# Patient Record
Sex: MALE | Race: WHITE | NOT HISPANIC OR LATINO | Employment: FULL TIME | URBAN - NONMETROPOLITAN AREA
[De-identification: names, ages, dates, MRNs, and addresses within clinical notes are randomized per-mention and may not be internally consistent; named-entity substitution may affect disease eponyms.]

---

## 2023-09-20 ENCOUNTER — OFFICE VISIT (OUTPATIENT)
Dept: PRIMARY CARE | Facility: CLINIC | Age: 38
End: 2023-09-20
Payer: COMMERCIAL

## 2023-09-20 VITALS
TEMPERATURE: 97.8 F | OXYGEN SATURATION: 99 % | BODY MASS INDEX: 37.93 KG/M2 | SYSTOLIC BLOOD PRESSURE: 114 MMHG | DIASTOLIC BLOOD PRESSURE: 70 MMHG | HEIGHT: 66 IN | HEART RATE: 86 BPM | WEIGHT: 236 LBS

## 2023-09-20 DIAGNOSIS — R56.9 SEIZURES (MULTI): Primary | ICD-10-CM

## 2023-09-20 PROCEDURE — 1036F TOBACCO NON-USER: CPT | Performed by: FAMILY MEDICINE

## 2023-09-20 PROCEDURE — 80156 ASSAY CARBAMAZEPINE TOTAL: CPT

## 2023-09-20 PROCEDURE — 80053 COMPREHEN METABOLIC PANEL: CPT

## 2023-09-20 PROCEDURE — 99204 OFFICE O/P NEW MOD 45 MIN: CPT | Performed by: FAMILY MEDICINE

## 2023-09-20 RX ORDER — CARBAMAZEPINE 200 MG/1
TABLET ORAL
COMMUNITY
End: 2023-09-20 | Stop reason: SDUPTHER

## 2023-09-20 RX ORDER — CARBAMAZEPINE 200 MG/1
TABLET ORAL
Qty: 270 TABLET | Refills: 1 | Status: SHIPPED | OUTPATIENT
Start: 2023-09-20 | End: 2023-11-13 | Stop reason: SDUPTHER

## 2023-09-20 ASSESSMENT — ENCOUNTER SYMPTOMS
RHINORRHEA: 0
VOMITING: 0
FLANK PAIN: 0
DIZZINESS: 0
SLEEP DISTURBANCE: 0
NUMBNESS: 0
CONSTIPATION: 0
MYALGIAS: 0
DYSURIA: 0
UNEXPECTED WEIGHT CHANGE: 0
WHEEZING: 0
ABDOMINAL PAIN: 0
FEVER: 0
HEADACHES: 0
EYE DISCHARGE: 0
PALPITATIONS: 0
WEAKNESS: 0
APPETITE CHANGE: 0
COUGH: 0
SEIZURES: 1
EYE ITCHING: 0
HEMATURIA: 0
DIARRHEA: 0
SORE THROAT: 0
BLOOD IN STOOL: 0
JOINT SWELLING: 0
NAUSEA: 0
LIGHT-HEADEDNESS: 0
ACTIVITY CHANGE: 0
SINUS PRESSURE: 0
ARTHRALGIAS: 0
DYSPHORIC MOOD: 0
NERVOUS/ANXIOUS: 0
SHORTNESS OF BREATH: 0

## 2023-09-20 NOTE — PATIENT INSTRUCTIONS
SIGN THE ORDER FOR THE LICENSE   THE OFFICE WILL CALL ON THE LABS   SEE YEARLY SOONER WHEN NEEDED
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
## 2023-09-21 LAB
ALANINE AMINOTRANSFERASE (SGPT) (U/L) IN SER/PLAS: 33 U/L (ref 10–52)
ALBUMIN (G/DL) IN SER/PLAS: 4.5 G/DL (ref 3.4–5)
ALKALINE PHOSPHATASE (U/L) IN SER/PLAS: 77 U/L (ref 33–120)
ANION GAP IN SER/PLAS: 10 MMOL/L (ref 10–20)
ASPARTATE AMINOTRANSFERASE (SGOT) (U/L) IN SER/PLAS: 20 U/L (ref 9–39)
BILIRUBIN TOTAL (MG/DL) IN SER/PLAS: 0.3 MG/DL (ref 0–1.2)
CALCIUM (MG/DL) IN SER/PLAS: 9.9 MG/DL (ref 8.6–10.6)
CARBAMAZEPINE (UG/ML) IN SER/PLAS: 9.3 UG/ML (ref 4–12)
CARBON DIOXIDE, TOTAL (MMOL/L) IN SER/PLAS: 28 MMOL/L (ref 21–32)
CHLORIDE (MMOL/L) IN SER/PLAS: 102 MMOL/L (ref 98–107)
CREATININE (MG/DL) IN SER/PLAS: 0.78 MG/DL (ref 0.5–1.3)
GFR MALE: >90 ML/MIN/1.73M2
GLUCOSE (MG/DL) IN SER/PLAS: 101 MG/DL (ref 74–99)
POTASSIUM (MMOL/L) IN SER/PLAS: 3.9 MMOL/L (ref 3.5–5.3)
PROTEIN TOTAL: 7.4 G/DL (ref 6.4–8.2)
SODIUM (MMOL/L) IN SER/PLAS: 136 MMOL/L (ref 136–145)
UREA NITROGEN (MG/DL) IN SER/PLAS: 10 MG/DL (ref 6–23)

## 2023-09-28 ENCOUNTER — TELEPHONE (OUTPATIENT)
Dept: PRIMARY CARE | Facility: CLINIC | Age: 38
End: 2023-09-28
Payer: COMMERCIAL

## 2023-09-28 NOTE — LETTER
September 28, 2023     Hang Tobias  [address] [facility] [address]    Patient: Hang Tobias   YOB: 1985   Date of Visit: 9/28/2023       Khang Mauricio I have been trying to reach you regarding your results.  Please contact us at your earliest convenience.  Also, Please have a current number handy to give when you call.    Thank you,      Ro Jimenez MA      CC: No Recipients  ______________________________________________________________________________________

## 2023-11-13 DIAGNOSIS — R56.9 SEIZURES (MULTI): ICD-10-CM

## 2023-11-13 RX ORDER — CARBAMAZEPINE 200 MG/1
TABLET ORAL
Qty: 270 TABLET | Refills: 1 | Status: SHIPPED | OUTPATIENT
Start: 2023-11-13 | End: 2024-02-21

## 2024-01-08 ENCOUNTER — OFFICE VISIT (OUTPATIENT)
Dept: PRIMARY CARE | Facility: CLINIC | Age: 39
End: 2024-01-08
Payer: COMMERCIAL

## 2024-01-08 VITALS
HEART RATE: 105 BPM | DIASTOLIC BLOOD PRESSURE: 82 MMHG | TEMPERATURE: 97.4 F | BODY MASS INDEX: 37.48 KG/M2 | WEIGHT: 234 LBS | OXYGEN SATURATION: 98 % | SYSTOLIC BLOOD PRESSURE: 116 MMHG

## 2024-01-08 DIAGNOSIS — R05.8 DRY COUGH: ICD-10-CM

## 2024-01-08 DIAGNOSIS — R05.1 ACUTE COUGH: ICD-10-CM

## 2024-01-08 DIAGNOSIS — R51.9 ACUTE NONINTRACTABLE HEADACHE, UNSPECIFIED HEADACHE TYPE: ICD-10-CM

## 2024-01-08 LAB — RSV RNA RESP QL NAA+PROBE: NOT DETECTED

## 2024-01-08 PROCEDURE — 87637 SARSCOV2&INF A&B&RSV AMP PRB: CPT

## 2024-01-08 PROCEDURE — 99214 OFFICE O/P EST MOD 30 MIN: CPT | Performed by: FAMILY MEDICINE

## 2024-01-08 PROCEDURE — 1036F TOBACCO NON-USER: CPT | Performed by: FAMILY MEDICINE

## 2024-01-08 ASSESSMENT — ENCOUNTER SYMPTOMS
HEADACHES: 1
RHINORRHEA: 0
SORE THROAT: 1
COUGH: 1
SINUS PRESSURE: 0
DIARRHEA: 0
WEAKNESS: 0
ABDOMINAL PAIN: 0
FLANK PAIN: 0
NERVOUS/ANXIOUS: 0
DIZZINESS: 0
FATIGUE: 1
VOMITING: 0
SHORTNESS OF BREATH: 1
NAUSEA: 0
UNEXPECTED WEIGHT CHANGE: 0
APPETITE CHANGE: 0
LIGHT-HEADEDNESS: 0
BLOOD IN STOOL: 0
CHILLS: 1
CONSTIPATION: 0
JOINT SWELLING: 0
HEMATURIA: 0
MYALGIAS: 1
DYSURIA: 0
SLEEP DISTURBANCE: 0
ARTHRALGIAS: 0
DYSPHORIC MOOD: 0
WHEEZING: 0
PALPITATIONS: 0
EYE ITCHING: 0
FEVER: 0
ACTIVITY CHANGE: 0
EYE DISCHARGE: 0
NUMBNESS: 0
DIAPHORESIS: 1

## 2024-01-08 NOTE — PROGRESS NOTES
Subjective   Patient ID: Hang Tobias is a 38 y.o. male who presents for cough (All sxs since Friday), muscle aches, Headache, and fatigue.  Work person ill now the patient and his wife   He was covid - at home     HPI     Review of Systems   Constitutional:  Positive for chills, diaphoresis and fatigue. Negative for activity change, appetite change, fever and unexpected weight change.   HENT:  Positive for congestion and sore throat. Negative for ear pain, postnasal drip, rhinorrhea and sinus pressure.    Eyes:  Negative for discharge, itching and visual disturbance.   Respiratory:  Positive for cough and shortness of breath. Negative for wheezing.    Cardiovascular:  Negative for chest pain, palpitations and leg swelling.   Gastrointestinal:  Negative for abdominal pain, blood in stool, constipation, diarrhea, nausea and vomiting.   Endocrine: Negative for cold intolerance, heat intolerance and polyuria.   Genitourinary:  Negative for dysuria, flank pain and hematuria.   Musculoskeletal:  Positive for myalgias. Negative for arthralgias, gait problem and joint swelling.   Skin:  Negative for rash.   Allergic/Immunologic: Negative for environmental allergies and food allergies.   Neurological:  Positive for headaches. Negative for dizziness, syncope, weakness, light-headedness and numbness.   Psychiatric/Behavioral:  Negative for dysphoric mood and sleep disturbance. The patient is not nervous/anxious.        Objective   /82   Pulse 105   Temp 36.3 °C (97.4 °F)   Wt 106 kg (234 lb)   SpO2 98%   BMI 37.48 kg/m²     Physical Exam  Vitals and nursing note reviewed.   Constitutional:       Appearance: Normal appearance. He is ill-appearing.   HENT:      Head: Normocephalic.      Mouth/Throat:      Mouth: Mucous membranes are moist.   Cardiovascular:      Rate and Rhythm: Regular rhythm. Tachycardia present.      Pulses: Normal pulses.      Heart sounds: Normal heart sounds. No murmur heard.     No friction  rub. No gallop.   Pulmonary:      Effort: Respiratory distress present.      Breath sounds: Rhonchi and rales present. No wheezing.   Abdominal:      General: Bowel sounds are normal. There is no distension.      Palpations: Abdomen is soft.      Tenderness: There is no abdominal tenderness.   Musculoskeletal:         General: No deformity. Normal range of motion.   Skin:     General: Skin is warm and dry.      Capillary Refill: Capillary refill takes less than 2 seconds.   Neurological:      General: No focal deficit present.      Mental Status: He is alert and oriented to person, place, and time.   Psychiatric:         Mood and Affect: Mood normal.         Assessment/Plan   Diagnoses and all orders for this visit:  Acute cough  -     RSV PCR  -     Influenza A, and B PCR  -     Sars-CoV-2 PCR, Symptomatic  Dry cough  -     RSV PCR  -     Influenza A, and B PCR  -     Sars-CoV-2 PCR, Symptomatic  Acute nonintractable headache, unspecified headache type  -     RSV PCR  -     Influenza A, and B PCR  -     Sars-CoV-2 PCR, Symptomatic

## 2024-01-08 NOTE — LETTER
January 8, 2024     Patient: Hang Tobias   YOB: 1985   Date of Visit: 1/8/2024       To Whom It May Concern:    Hang Tobias was seen in my clinic on 1/8/2024 at 4:00 pm. Please excuse Hang for his absence from work on the following days: 1/5/24-1/11/24.    If you have any questions or concerns, please don't hesitate to call.         Sincerely,         Joanne Weems,         CC: No Recipients

## 2024-01-09 ENCOUNTER — TELEPHONE (OUTPATIENT)
Dept: PRIMARY CARE | Facility: CLINIC | Age: 39
End: 2024-01-09
Payer: COMMERCIAL

## 2024-01-09 LAB
FLUAV RNA RESP QL NAA+PROBE: DETECTED
FLUBV RNA RESP QL NAA+PROBE: NOT DETECTED
SARS-COV-2 RNA RESP QL NAA+PROBE: NOT DETECTED

## 2024-01-09 NOTE — TELEPHONE ENCOUNTER
----- Message from Joanne Weems DO sent at 1/9/2024  8:24 AM EST -----  He has type a flu  Was it too late for tamiflu for him 72 hours ?

## 2024-01-09 NOTE — LETTER
January 16, 2024     Hang Tobias  [address] [facility] [address]    Patient: Hang Tobias   YOB: 1985   Date of Visit: 1/9/2024       Lalito FARMER have been trying to reach you regarding your results. Please contact the office to update your contact information.      Sincerely,     Ro Jimenez MA      CC: No Recipients  ______________________________________________________________________________________

## 2024-01-16 NOTE — TELEPHONE ENCOUNTER
Number is not working.  I attempted to contact people on his HIPAA and they went to  right away.  Letter sent.

## 2024-02-21 DIAGNOSIS — R56.9 SEIZURES (MULTI): ICD-10-CM

## 2024-02-21 RX ORDER — CARBAMAZEPINE 200 MG/1
TABLET ORAL
Qty: 270 TABLET | Refills: 0 | Status: SHIPPED | OUTPATIENT
Start: 2024-02-21

## 2024-06-21 ENCOUNTER — OFFICE VISIT (OUTPATIENT)
Dept: PRIMARY CARE | Facility: CLINIC | Age: 39
End: 2024-06-21
Payer: COMMERCIAL

## 2024-06-21 VITALS
BODY MASS INDEX: 36.36 KG/M2 | WEIGHT: 227 LBS | HEART RATE: 83 BPM | DIASTOLIC BLOOD PRESSURE: 64 MMHG | TEMPERATURE: 96.9 F | OXYGEN SATURATION: 97 % | SYSTOLIC BLOOD PRESSURE: 102 MMHG

## 2024-06-21 DIAGNOSIS — R56.9 SEIZURES (MULTI): Primary | ICD-10-CM

## 2024-06-21 PROCEDURE — 99214 OFFICE O/P EST MOD 30 MIN: CPT | Performed by: FAMILY MEDICINE

## 2024-06-21 PROCEDURE — 1036F TOBACCO NON-USER: CPT | Performed by: FAMILY MEDICINE

## 2024-06-21 RX ORDER — CARBAMAZEPINE 200 MG/1
TABLET ORAL
Qty: 270 TABLET | Refills: 3 | Status: SHIPPED | OUTPATIENT
Start: 2024-06-21

## 2024-06-21 ASSESSMENT — ENCOUNTER SYMPTOMS
NUMBNESS: 0
SHORTNESS OF BREATH: 0
BLOOD IN STOOL: 0
HEMATURIA: 0
UNEXPECTED WEIGHT CHANGE: 0
JOINT SWELLING: 0
HEADACHES: 0
WHEEZING: 0
NAUSEA: 0
ACTIVITY CHANGE: 0
DIZZINESS: 0
RHINORRHEA: 0
SLEEP DISTURBANCE: 0
MYALGIAS: 0
CONSTIPATION: 0
ABDOMINAL PAIN: 0
COUGH: 0
ARTHRALGIAS: 0
DIARRHEA: 0
FLANK PAIN: 0
VOMITING: 0
EYE ITCHING: 0
APPETITE CHANGE: 0
WEAKNESS: 0
SORE THROAT: 0
NERVOUS/ANXIOUS: 0
FEVER: 0
LIGHT-HEADEDNESS: 0
PALPITATIONS: 0
SINUS PRESSURE: 0
DYSURIA: 0
EYE DISCHARGE: 0
DYSPHORIC MOOD: 0

## 2024-06-21 NOTE — PROGRESS NOTES
Subjective   Patient ID: Hang Tobias is a 38 y.o. male who presents for Med Refill.    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   HENT:  Negative for congestion, ear pain, postnasal drip, rhinorrhea, sinus pressure and sore throat.    Eyes:  Negative for discharge, itching and visual disturbance.   Respiratory:  Negative for cough, shortness of breath and wheezing.    Cardiovascular:  Negative for chest pain, palpitations and leg swelling.   Gastrointestinal:  Negative for abdominal pain, blood in stool, constipation, diarrhea, nausea and vomiting.   Endocrine: Negative for cold intolerance, heat intolerance and polyuria.   Genitourinary:  Negative for dysuria, flank pain and hematuria.   Musculoskeletal:  Negative for arthralgias, gait problem, joint swelling and myalgias.   Skin:  Negative for rash.   Allergic/Immunologic: Negative for environmental allergies and food allergies.   Neurological:  Negative for dizziness, syncope, weakness, light-headedness, numbness and headaches.   Psychiatric/Behavioral:  Negative for dysphoric mood and sleep disturbance. The patient is not nervous/anxious.        Objective   /64   Pulse 83   Temp 36.1 °C (96.9 °F)   Wt 103 kg (227 lb)   SpO2 97%   BMI 36.36 kg/m²     Physical Exam  Vitals and nursing note reviewed.   Constitutional:       Appearance: Normal appearance. He is obese.   HENT:      Head: Normocephalic.      Mouth/Throat:      Mouth: Mucous membranes are moist.   Cardiovascular:      Rate and Rhythm: Normal rate and regular rhythm.      Pulses: Normal pulses.      Heart sounds: Normal heart sounds. No murmur heard.     No friction rub. No gallop.   Pulmonary:      Effort: Pulmonary effort is normal. No respiratory distress.       Breath sounds: Normal breath sounds. No wheezing.   Abdominal:      General: Bowel sounds are normal. There is no distension.      Palpations: Abdomen is soft.      Tenderness: There is no abdominal tenderness.   Musculoskeletal:         General: No deformity. Normal range of motion.   Skin:     General: Skin is warm and dry.      Capillary Refill: Capillary refill takes less than 2 seconds.   Neurological:      General: No focal deficit present.      Mental Status: He is alert and oriented to person, place, and time.   Psychiatric:         Mood and Affect: Mood normal.         Assessment/Plan   Problem List Items Addressed This Visit             ICD-10-CM    Seizures (Multi) - Primary R56.9    Relevant Medications    carBAMazepine (TEGretol) 200 mg tablet

## 2024-09-13 ENCOUNTER — TELEPHONE (OUTPATIENT)
Dept: PRIMARY CARE | Facility: CLINIC | Age: 39
End: 2024-09-13
Payer: COMMERCIAL

## 2024-11-19 ENCOUNTER — OFFICE VISIT (OUTPATIENT)
Dept: URGENT CARE | Facility: URGENT CARE | Age: 39
End: 2024-11-19
Payer: COMMERCIAL

## 2024-11-19 VITALS
WEIGHT: 238.1 LBS | OXYGEN SATURATION: 95 % | RESPIRATION RATE: 18 BRPM | BODY MASS INDEX: 38.14 KG/M2 | HEART RATE: 111 BPM | SYSTOLIC BLOOD PRESSURE: 139 MMHG | TEMPERATURE: 98.6 F | DIASTOLIC BLOOD PRESSURE: 85 MMHG

## 2024-11-19 DIAGNOSIS — J98.01 BRONCHOSPASM, ACUTE: ICD-10-CM

## 2024-11-19 DIAGNOSIS — J20.9 ACUTE BRONCHITIS WITH BRONCHOSPASM: ICD-10-CM

## 2024-11-19 DIAGNOSIS — J06.9 VIRAL URI: Primary | ICD-10-CM

## 2024-11-19 PROCEDURE — 99203 OFFICE O/P NEW LOW 30 MIN: CPT | Performed by: FAMILY MEDICINE

## 2024-11-19 PROCEDURE — 1036F TOBACCO NON-USER: CPT | Performed by: FAMILY MEDICINE

## 2024-11-19 RX ORDER — ALBUTEROL SULFATE 90 UG/1
INHALANT RESPIRATORY (INHALATION)
Qty: 6.7 G | Refills: 0 | Status: SHIPPED | OUTPATIENT
Start: 2024-11-19

## 2024-11-19 RX ORDER — INHALER, ASSIST DEVICES
SPACER (EA) MISCELLANEOUS
Qty: 1 EACH | Refills: 0 | Status: SHIPPED | OUTPATIENT
Start: 2024-11-19

## 2024-11-19 ASSESSMENT — ENCOUNTER SYMPTOMS
CHEST TIGHTNESS: 1
WHEEZING: 1
NAUSEA: 0
HEADACHES: 1
SINUS PRESSURE: 0
RHINORRHEA: 1
SHORTNESS OF BREATH: 1
ABDOMINAL PAIN: 1
SORE THROAT: 0
CHILLS: 0
DIARRHEA: 1
PALPITATIONS: 0
VOMITING: 0
DYSURIA: 0
FREQUENCY: 0
CONSTIPATION: 0
COUGH: 1
FEVER: 1

## 2024-11-19 NOTE — PROGRESS NOTES
Subjective   Patient ID: Hang Tobias is a 38 y.o. male.    HPI: 38-year-old male presents with a complaint of wheezing x 4 days and a cough since yesterday.  Reports he is taken DayQuil and NyQuil without symptom relief.      History provided by:  Patient   used: No    Cough  This is a new problem. The current episode started yesterday. The problem has been gradually worsening. The problem occurs every few minutes. The cough is Productive of purulent sputum. Associated symptoms include a fever, headaches, rhinorrhea, shortness of breath and wheezing. Pertinent negatives include no chills, ear pain or sore throat. The symptoms are aggravated by lying down. Risk factors for lung disease include occupational exposure. He has tried nothing for the symptoms.   Wheezing  Associated symptoms: chest tightness, cough, fever, headaches, rhinorrhea and shortness of breath    Associated symptoms: no ear pain and no sore throat        The following portions of the chart were reviewed this encounter and updated as appropriate:  Tobacco  Allergies  Meds  Problems  Med Hx  Surg Hx  Fam Hx         Review of Systems   Constitutional:  Positive for fever. Negative for chills.   HENT:  Positive for rhinorrhea. Negative for congestion, ear pain, sinus pressure and sore throat.    Respiratory:  Positive for cough, chest tightness, shortness of breath and wheezing.    Cardiovascular:  Negative for palpitations.   Gastrointestinal:  Positive for abdominal pain and diarrhea. Negative for constipation, nausea and vomiting.   Genitourinary:  Negative for dysuria and frequency.   Neurological:  Positive for headaches.     Objective   Physical Exam  Vital signs are reviewed.  Tachycardia at 111 bpm.  Pulse oximetry 95% on room air.  Alert and oriented x3 with normal mood and affect  Patient is well nourished, well-developed, alert and in no acute distress    External eyes, orbits, conjunctiva and eyelids are  normal in appearance  Pupils are equal, round, reactive to light and accommodation, extraocular movements intact    External ears appear normal  External canals are normal in appearance  Right tympanic membrane is intact and pale gray in appearance  Left tympanic membrane is intact and pale gray in appearance  There is no middle ear effusion noted on the right  There is no middle ear effusion noted on the left  External appearance of the nose is normal  Nasal mucosa, septum, turbinates are reddened and moderately swollen in appearance  There is scant thin yellow nasal discharge in both nares    Oral mucosa is uniformly pink and moist  Palate is pink, symmetric and intact  Tongue is moist, mobile and midline  Posterior pharynx moderately erythematous with no concretions or exudates present  No cervical lymphadenopathy palpated    Heart has regular rate and rhythm. No murmurs, rubs or gallops are auscultated at this exam.    Respiratory rate rhythm and effort are normal. Breath sounds bilaterally are coarse on auscultation without crackles, rhonchi or friction rub.  Fine end expiratory wheezes.    Abdomen: Normal bowel sounds on auscultation. Soft, nontender without rebound or rigidity on palpation  Procedures    Assessment/Plan   Diagnoses and all orders for this visit:  Viral URI  Acute bronchitis with bronchospasm  -     inhalational spacing device (Aerochamber MV) inhaler; Use as instructed  -     albuterol (Proventil HFA) 90 mcg/actuation inhaler; Take 1 puff via spacer,  take 5-6 easy breaths.  Rest 2 minutes.  Repeat x1.  May use every 4-6 hours as needed  Bronchospasm, acute  -     inhalational spacing device (Aerochamber MV) inhaler; Use as instructed  -     albuterol (Proventil HFA) 90 mcg/actuation inhaler; Take 1 puff via spacer,  take 5-6 easy breaths.  Rest 2 minutes.  Repeat x1.  May use every 4-6 hours as needed    Patient disposition: Home

## 2024-11-19 NOTE — PATIENT INSTRUCTIONS
You have a viral upper respiratory infection with cough and acute bronchitis with bronchospasm  Use inhaler with spacer device 2 puffs every 4-6 hours for the next 7-10 days, decrease frequency of use as symptoms improve. Please take 5-6 deep breaths after activating inhaler from spacer chamber. Pause for approximately 1-2 minutes.  Repeat activation of inhaler and 5-6 deep breaths from spacer.  Decrease frequency of use as symptoms improve  May use ibuprofen 200mg, 2 tabs by mouth every 8 hours with food as needed for discomfort or fever.  May take Tylenol 325 mg 2 tablets by mouth every 4-6 hours as needed for discomfort or fever  If no improvement in 5-7 days follow-up with primary care provider  If fever greater than 102 degrees Fahrenheit, chills, nausea, vomiting,  increased difficulty breathing, difficulty swallowing,  increased shortness of breath, worsening wheezing go to emergency department for further evaluation  This note was generated by voice recognition software. Minor transcription/grammatical errors may be present. Please call for clarification.

## 2024-11-20 ENCOUNTER — APPOINTMENT (OUTPATIENT)
Dept: CARDIOLOGY | Facility: HOSPITAL | Age: 39
End: 2024-11-20
Payer: COMMERCIAL

## 2024-11-20 ENCOUNTER — APPOINTMENT (OUTPATIENT)
Dept: RADIOLOGY | Facility: HOSPITAL | Age: 39
End: 2024-11-20
Payer: COMMERCIAL

## 2024-11-20 ENCOUNTER — HOSPITAL ENCOUNTER (EMERGENCY)
Facility: HOSPITAL | Age: 39
Discharge: HOME | End: 2024-11-20
Attending: EMERGENCY MEDICINE
Payer: COMMERCIAL

## 2024-11-20 VITALS
SYSTOLIC BLOOD PRESSURE: 122 MMHG | BODY MASS INDEX: 38.25 KG/M2 | TEMPERATURE: 99.4 F | OXYGEN SATURATION: 95 % | WEIGHT: 238 LBS | DIASTOLIC BLOOD PRESSURE: 79 MMHG | HEART RATE: 96 BPM | HEIGHT: 66 IN | RESPIRATION RATE: 18 BRPM

## 2024-11-20 DIAGNOSIS — R06.02 SHORTNESS OF BREATH: ICD-10-CM

## 2024-11-20 DIAGNOSIS — J18.9 PNEUMONIA OF LEFT LOWER LOBE DUE TO INFECTIOUS ORGANISM: Primary | ICD-10-CM

## 2024-11-20 DIAGNOSIS — R50.9 FEBRILE ILLNESS: ICD-10-CM

## 2024-11-20 DIAGNOSIS — R05.1 ACUTE COUGH: ICD-10-CM

## 2024-11-20 LAB
ALBUMIN SERPL BCP-MCNC: 3.7 G/DL (ref 3.4–5)
ALP SERPL-CCNC: 71 U/L (ref 33–120)
ALT SERPL W P-5'-P-CCNC: 17 U/L (ref 10–52)
ANION GAP SERPL CALC-SCNC: 12 MMOL/L (ref 10–20)
AST SERPL W P-5'-P-CCNC: 19 U/L (ref 9–39)
BASOPHILS # BLD AUTO: 0.05 X10*3/UL (ref 0–0.1)
BASOPHILS NFR BLD AUTO: 0.5 %
BILIRUB SERPL-MCNC: 0.5 MG/DL (ref 0–1.2)
BUN SERPL-MCNC: 7 MG/DL (ref 6–23)
CALCIUM SERPL-MCNC: 8.8 MG/DL (ref 8.6–10.3)
CARDIAC TROPONIN I PNL SERPL HS: 5 NG/L (ref 0–20)
CHLORIDE SERPL-SCNC: 98 MMOL/L (ref 98–107)
CO2 SERPL-SCNC: 26 MMOL/L (ref 21–32)
CREAT SERPL-MCNC: 0.75 MG/DL (ref 0.5–1.3)
EGFRCR SERPLBLD CKD-EPI 2021: >90 ML/MIN/1.73M*2
EOSINOPHIL # BLD AUTO: 0.11 X10*3/UL (ref 0–0.7)
EOSINOPHIL NFR BLD AUTO: 1 %
ERYTHROCYTE [DISTWIDTH] IN BLOOD BY AUTOMATED COUNT: 11.9 % (ref 11.5–14.5)
FLUAV RNA RESP QL NAA+PROBE: NOT DETECTED
FLUBV RNA RESP QL NAA+PROBE: NOT DETECTED
GLUCOSE SERPL-MCNC: 127 MG/DL (ref 74–99)
HCT VFR BLD AUTO: 41.5 % (ref 41–52)
HGB BLD-MCNC: 14.1 G/DL (ref 13.5–17.5)
IMM GRANULOCYTES # BLD AUTO: 0.05 X10*3/UL (ref 0–0.7)
IMM GRANULOCYTES NFR BLD AUTO: 0.5 % (ref 0–0.9)
LYMPHOCYTES # BLD AUTO: 1.4 X10*3/UL (ref 1.2–4.8)
LYMPHOCYTES NFR BLD AUTO: 13 %
MCH RBC QN AUTO: 28.5 PG (ref 26–34)
MCHC RBC AUTO-ENTMCNC: 34 G/DL (ref 32–36)
MCV RBC AUTO: 84 FL (ref 80–100)
MONOCYTES # BLD AUTO: 0.72 X10*3/UL (ref 0.1–1)
MONOCYTES NFR BLD AUTO: 6.7 %
NEUTROPHILS # BLD AUTO: 8.47 X10*3/UL (ref 1.2–7.7)
NEUTROPHILS NFR BLD AUTO: 78.3 %
NRBC BLD-RTO: 0 /100 WBCS (ref 0–0)
PLATELET # BLD AUTO: 227 X10*3/UL (ref 150–450)
POTASSIUM SERPL-SCNC: 3.4 MMOL/L (ref 3.5–5.3)
PROT SERPL-MCNC: 7 G/DL (ref 6.4–8.2)
RBC # BLD AUTO: 4.94 X10*6/UL (ref 4.5–5.9)
RSV RNA RESP QL NAA+PROBE: NOT DETECTED
S PYO DNA THROAT QL NAA+PROBE: NOT DETECTED
SARS-COV-2 RNA RESP QL NAA+PROBE: NOT DETECTED
SODIUM SERPL-SCNC: 133 MMOL/L (ref 136–145)
WBC # BLD AUTO: 10.8 X10*3/UL (ref 4.4–11.3)

## 2024-11-20 PROCEDURE — 99284 EMERGENCY DEPT VISIT MOD MDM: CPT | Mod: 25

## 2024-11-20 PROCEDURE — 87651 STREP A DNA AMP PROBE: CPT | Performed by: EMERGENCY MEDICINE

## 2024-11-20 PROCEDURE — 84484 ASSAY OF TROPONIN QUANT: CPT | Performed by: EMERGENCY MEDICINE

## 2024-11-20 PROCEDURE — 71046 X-RAY EXAM CHEST 2 VIEWS: CPT

## 2024-11-20 PROCEDURE — 93005 ELECTROCARDIOGRAM TRACING: CPT

## 2024-11-20 PROCEDURE — 96374 THER/PROPH/DIAG INJ IV PUSH: CPT

## 2024-11-20 PROCEDURE — 36415 COLL VENOUS BLD VENIPUNCTURE: CPT | Performed by: EMERGENCY MEDICINE

## 2024-11-20 PROCEDURE — 94760 N-INVAS EAR/PLS OXIMETRY 1: CPT

## 2024-11-20 PROCEDURE — 94640 AIRWAY INHALATION TREATMENT: CPT

## 2024-11-20 PROCEDURE — 2500000002 HC RX 250 W HCPCS SELF ADMINISTERED DRUGS (ALT 637 FOR MEDICARE OP, ALT 636 FOR OP/ED): Performed by: EMERGENCY MEDICINE

## 2024-11-20 PROCEDURE — 87637 SARSCOV2&INF A&B&RSV AMP PRB: CPT | Performed by: EMERGENCY MEDICINE

## 2024-11-20 PROCEDURE — 80053 COMPREHEN METABOLIC PANEL: CPT | Performed by: EMERGENCY MEDICINE

## 2024-11-20 PROCEDURE — 9420000001 HC RT PATIENT EDUCATION 5 MIN

## 2024-11-20 PROCEDURE — 94664 DEMO&/EVAL PT USE INHALER: CPT | Mod: 59

## 2024-11-20 PROCEDURE — 2500000004 HC RX 250 GENERAL PHARMACY W/ HCPCS (ALT 636 FOR OP/ED): Performed by: EMERGENCY MEDICINE

## 2024-11-20 PROCEDURE — 2500000001 HC RX 250 WO HCPCS SELF ADMINISTERED DRUGS (ALT 637 FOR MEDICARE OP): Performed by: EMERGENCY MEDICINE

## 2024-11-20 PROCEDURE — 71046 X-RAY EXAM CHEST 2 VIEWS: CPT | Mod: FOREIGN READ | Performed by: RADIOLOGY

## 2024-11-20 PROCEDURE — 85025 COMPLETE CBC W/AUTO DIFF WBC: CPT | Performed by: EMERGENCY MEDICINE

## 2024-11-20 RX ORDER — BROMPHENIRAMINE MALEATE, PSEUDOEPHEDRINE HYDROCHLORIDE, AND DEXTROMETHORPHAN HYDROBROMIDE 2; 30; 10 MG/5ML; MG/5ML; MG/5ML
5 SYRUP ORAL 4 TIMES DAILY PRN
Qty: 120 ML | Refills: 0 | Status: SHIPPED | OUTPATIENT
Start: 2024-11-20 | End: 2024-11-30

## 2024-11-20 RX ORDER — PREDNISONE 20 MG/1
40 TABLET ORAL DAILY
Qty: 10 TABLET | Refills: 0 | Status: SHIPPED | OUTPATIENT
Start: 2024-11-20 | End: 2024-11-25

## 2024-11-20 RX ORDER — DOXYCYCLINE 100 MG/1
100 TABLET ORAL 2 TIMES DAILY
Qty: 14 TABLET | Refills: 0 | Status: SHIPPED | OUTPATIENT
Start: 2024-11-20 | End: 2024-11-27

## 2024-11-20 RX ORDER — IPRATROPIUM BROMIDE AND ALBUTEROL SULFATE 2.5; .5 MG/3ML; MG/3ML
3 SOLUTION RESPIRATORY (INHALATION) ONCE
Status: COMPLETED | OUTPATIENT
Start: 2024-11-20 | End: 2024-11-20

## 2024-11-20 RX ORDER — ACETAMINOPHEN 325 MG/1
975 TABLET ORAL ONCE
Status: COMPLETED | OUTPATIENT
Start: 2024-11-20 | End: 2024-11-20

## 2024-11-20 ASSESSMENT — PAIN - FUNCTIONAL ASSESSMENT: PAIN_FUNCTIONAL_ASSESSMENT: 0-10

## 2024-11-20 ASSESSMENT — PAIN SCALES - GENERAL: PAINLEVEL_OUTOF10: 6

## 2024-11-20 ASSESSMENT — COLUMBIA-SUICIDE SEVERITY RATING SCALE - C-SSRS
1. IN THE PAST MONTH, HAVE YOU WISHED YOU WERE DEAD OR WISHED YOU COULD GO TO SLEEP AND NOT WAKE UP?: NO
2. HAVE YOU ACTUALLY HAD ANY THOUGHTS OF KILLING YOURSELF?: NO
6. HAVE YOU EVER DONE ANYTHING, STARTED TO DO ANYTHING, OR PREPARED TO DO ANYTHING TO END YOUR LIFE?: NO

## 2024-11-20 ASSESSMENT — PAIN DESCRIPTION - LOCATION: LOCATION: CHEST

## 2024-11-20 NOTE — Clinical Note
Hang Jatinder was seen and treated in our emergency department on 11/20/2024.  He may return to work on 11/25/2024.       If you have any questions or concerns, please don't hesitate to call.      Arcadio Jones, DO

## 2024-11-20 NOTE — ED TRIAGE NOTES
Pt from home to the ER for cough fever SOB and CP since Saturday night. Pt was seen at the urgent care yesterday pt states no testing was done just sent him home with an inhaler. Pt states his son had a PNA 3 weeks ago.

## 2024-11-20 NOTE — DISCHARGE INSTRUCTIONS
Use the albuterol inhaler you got yesterday from the urgent care center.  2 puffs every 4 hours as needed for cough/wheezing.    Alternate Tylenol 650 mg every 6 hours with ibuprofen 600 mg every 6 hours as needed for fevers body aches or pain.

## 2024-11-20 NOTE — ED PROVIDER NOTES
HPI   Chief Complaint   Patient presents with    Shortness of Breath    Chest Pain    Cough    Fever       38-year-old male presents for evaluation of shortness of breath, productive cough, wheezing, and fevers.  Patient has had symptoms for the past 5 days.  His son recently had pneumonia.  Cough is productive of green sputum.  Shortness of breath with exertion with associated wheezing.  Shortness of breath improves with rest.  No chest pain.  No vomiting.  Seen in urgent care yesterday.  Prescribed albuterol inhaler.  No history of PE or DVT.  No risk factors for venous thromboembolism.      History provided by:  Patient and medical records          Patient History   Past Medical History:   Diagnosis Date    Epilepsy      Past Surgical History:   Procedure Laterality Date    DENTAL SURGERY       Family History   Problem Relation Name Age of Onset    No Known Problems Mother      Epilepsy Other          [facility]     Social History     Tobacco Use    Smoking status: Never    Smokeless tobacco: Never   Vaping Use    Vaping status: Never Used   Substance Use Topics    Alcohol use: Yes     Comment: occasional    Drug use: Never       Physical Exam   ED Triage Vitals [11/20/24 1012]   Temperature Heart Rate Respirations BP   (!) 38.7 °C (101.6 °F) (!) 105 19 155/85      Pulse Ox Temp Source Heart Rate Source Patient Position   94 % Oral -- Sitting      BP Location FiO2 (%)     Right arm --       Physical Exam  Vitals and nursing note reviewed.   Constitutional:       General: He is not in acute distress.     Appearance: He is well-developed.   HENT:      Head: Normocephalic and atraumatic.   Eyes:      Conjunctiva/sclera: Conjunctivae normal.   Cardiovascular:      Rate and Rhythm: Regular rhythm. Tachycardia present.      Pulses: Normal pulses.      Heart sounds: No murmur heard.  Pulmonary:      Effort: Pulmonary effort is normal. No respiratory distress.      Breath sounds: Wheezing and rhonchi present.   Abdominal:       Palpations: Abdomen is soft.      Tenderness: There is no abdominal tenderness.   Musculoskeletal:         General: No swelling.      Cervical back: Neck supple.   Skin:     General: Skin is warm and dry.      Capillary Refill: Capillary refill takes less than 2 seconds.   Neurological:      Mental Status: He is alert.   Psychiatric:         Mood and Affect: Mood normal.           ED Course & MDM   ED Course as of 11/20/24 1243   Wed Nov 20, 2024   1047 ECG 12 lead  EKG interpreted by me shows sinus tachycardia with rate of 102.  Normal axis.  Normal intervals.  No acute injury pattern. [BT]   1104 38-year-old male presents with flulike symptoms, productive cough.  He is febrile and tachycardic on arrival.  Tachycardia likely secondary to fever and infectious presentation.  He has no risk factors for DVT or PE.  PERC negative.  Doubt pulmonary embolism.  Laboratory studies including single troponin.  COVID flu RSV strep.  2 view chest x-ray. [BT]   1233 XR chest 2 views  LUNGS:  There is a patchy infiltrate of the left lung base suspicious for  pneumonia.  No pleural effusion.     ABDOMEN:  No remarkable upper abdominal findings.     BONES:  No acute osseous changes.  IMPRESSION:  Left lower lobe pneumonia.  Signed by Reza Sethi MD    Noted [BT]   1240 COVID flu RSV negative.  Strep negative.  CBC, chemistry unremarkable.  Troponin negative.  EKG without acute injury pattern.  Doubt acute coronary syndrome.    Chest x-ray showed left lower lobe pneumonia.  This explains his fever and tachycardia, which have improved with Tylenol.    I considered admission.  No hypoxia or respiratory distress.  He is tolerating oral intake.  Safe for trial of outpatient management for community-acquired pneumonia.  He has an albuterol inhaler he was advised to continue using.  Doxycycline, prednisone, Bromfed-DM.  Referred PCP for follow-up.  Advised to return in 12 hours or sooner with vomiting, worsening shortness of breath,  or other concerns.  Patient agreeable with plan and verbalized understanding.  Discharged home. [BT]      ED Course User Index  [BT] Arcadio Jones DO         Diagnoses as of 11/20/24 1243   Acute cough   Shortness of breath   Febrile illness   Pneumonia of left lower lobe due to infectious organism                 No data recorded     Veronica Coma Scale Score: 15 (11/20/24 1015 : Dunia Hickman RN)                           Medical Decision Making      Procedure  Procedures     Arcadio Jones DO  11/20/24 1243

## 2024-12-06 ENCOUNTER — APPOINTMENT (OUTPATIENT)
Dept: PRIMARY CARE | Facility: CLINIC | Age: 39
End: 2024-12-06
Payer: COMMERCIAL

## 2024-12-06 VITALS
WEIGHT: 233 LBS | OXYGEN SATURATION: 97 % | TEMPERATURE: 99.4 F | SYSTOLIC BLOOD PRESSURE: 110 MMHG | BODY MASS INDEX: 37.61 KG/M2 | HEART RATE: 96 BPM | DIASTOLIC BLOOD PRESSURE: 66 MMHG

## 2024-12-06 DIAGNOSIS — R56.9 SEIZURES (MULTI): ICD-10-CM

## 2024-12-06 DIAGNOSIS — J18.9 PNEUMONIA OF LEFT LOWER LOBE DUE TO INFECTIOUS ORGANISM: Primary | ICD-10-CM

## 2024-12-06 DIAGNOSIS — Z51.81 THERAPEUTIC DRUG MONITORING: ICD-10-CM

## 2024-12-06 LAB
ALBUMIN SERPL BCP-MCNC: 4.2 G/DL (ref 3.4–5)
ALP SERPL-CCNC: 82 U/L (ref 33–120)
ALT SERPL W P-5'-P-CCNC: 38 U/L (ref 10–52)
ANION GAP SERPL CALC-SCNC: 15 MMOL/L (ref 10–20)
AST SERPL W P-5'-P-CCNC: 22 U/L (ref 9–39)
BILIRUB SERPL-MCNC: 0.5 MG/DL (ref 0–1.2)
BUN SERPL-MCNC: 8 MG/DL (ref 6–23)
CALCIUM SERPL-MCNC: 9.2 MG/DL (ref 8.6–10.3)
CHLORIDE SERPL-SCNC: 101 MMOL/L (ref 98–107)
CO2 SERPL-SCNC: 29 MMOL/L (ref 21–32)
CREAT SERPL-MCNC: 0.74 MG/DL (ref 0.5–1.3)
EGFRCR SERPLBLD CKD-EPI 2021: >90 ML/MIN/1.73M*2
GLUCOSE SERPL-MCNC: 93 MG/DL (ref 74–99)
POTASSIUM SERPL-SCNC: 4.3 MMOL/L (ref 3.5–5.3)
PROT SERPL-MCNC: 7 G/DL (ref 6.4–8.2)
SODIUM SERPL-SCNC: 141 MMOL/L (ref 136–145)

## 2024-12-06 PROCEDURE — 1036F TOBACCO NON-USER: CPT | Performed by: FAMILY MEDICINE

## 2024-12-06 PROCEDURE — 80053 COMPREHEN METABOLIC PANEL: CPT

## 2024-12-06 PROCEDURE — 99214 OFFICE O/P EST MOD 30 MIN: CPT | Performed by: FAMILY MEDICINE

## 2024-12-06 PROCEDURE — 80156 ASSAY CARBAMAZEPINE TOTAL: CPT

## 2024-12-06 RX ORDER — CARBAMAZEPINE 200 MG/1
TABLET ORAL
Qty: 270 TABLET | Refills: 3 | Status: SHIPPED | OUTPATIENT
Start: 2024-12-06

## 2024-12-06 ASSESSMENT — ENCOUNTER SYMPTOMS
HEADACHES: 0
ACTIVITY CHANGE: 0
NERVOUS/ANXIOUS: 0
SHORTNESS OF BREATH: 0
WEAKNESS: 0
COUGH: 1
DYSPHORIC MOOD: 0
DIARRHEA: 0
SEIZURES: 1
NAUSEA: 0
SORE THROAT: 0
JOINT SWELLING: 0
HEMATURIA: 0
LIGHT-HEADEDNESS: 0
DIZZINESS: 0
FEVER: 0
EYE DISCHARGE: 0
APPETITE CHANGE: 0
MYALGIAS: 0
CONSTIPATION: 0
ABDOMINAL PAIN: 0
RHINORRHEA: 0
DYSURIA: 0
ARTHRALGIAS: 0
PALPITATIONS: 0
SINUS PRESSURE: 0
WHEEZING: 0
VOMITING: 0
BLOOD IN STOOL: 0
EYE ITCHING: 0
UNEXPECTED WEIGHT CHANGE: 0
SLEEP DISTURBANCE: 0
FLANK PAIN: 0
NUMBNESS: 0

## 2024-12-06 NOTE — PATIENT INSTRUCTIONS
The pneumonia is resolving   Labs taken for the seizure medication   Follow when needed   GET THE FLU VACCINE /HEALTH DEPARTMENT OR DRUG[facility]ORE

## 2024-12-06 NOTE — PROGRESS NOTES
Subjective   Patient ID: Hang Tobias is a 38 y.o. male who presents for Follow-up (Was seen at  and told he has a bacterial infection./He went to [place] the next day and told he had pneumonia- was treated feeling a lot better, but still has cough.).    HPI REVIEWED ER AND THE CXR AND THERE IS A LLL PNEUMONIA   HE TOOK ANTIBIOTIC  (DOXYCYCLINE) AND MEDROL DOSE PACK  AND INHALER AND FEELS MUCH BETTER NOW  HE TAKES TEGRETOL FOR SEIZURE AND HAS BEEN SEIZURE FREE SINCE PRIOR VISIT         Review of Systems   Constitutional:  Negative for activity change, appetite change, fever and unexpected weight change.   HENT:  Negative for congestion, ear pain, postnasal drip, rhinorrhea, sinus pressure and sore throat.    Eyes:  Negative for discharge, itching and visual disturbance.   Respiratory:  Positive for cough. Negative for shortness of breath and wheezing.    Cardiovascular:  Negative for chest pain, palpitations and leg swelling.   Gastrointestinal:  Negative for abdominal pain, blood in stool, constipation, diarrhea, nausea and vomiting.   Endocrine: Negative for cold intolerance, heat intolerance and polyuria.   Genitourinary:  Negative for dysuria, flank pain and hematuria.   Musculoskeletal:  Negative for arthralgias, gait problem, joint swelling and myalgias.   Skin:  Negative for rash.   Allergic/Immunologic: Negative for environmental allergies and food allergies.   Neurological:  Positive for seizures. Negative for dizziness, syncope, weakness, light-headedness, numbness and headaches.   Psychiatric/Behavioral:  Negative for dysphoric mood and sleep disturbance. The patient is not nervous/anxious.        Objective   /66   Pulse 96   Temp 37.4 °C (99.4 °F)   Wt 106 kg (233 lb)   SpO2 97%   BMI 37.61 kg/m²     Physical Exam  Vitals and nursing note reviewed.   Constitutional:       Appearance: Normal appearance.   HENT:       Head: Normocephalic.   Cardiovascular:      Rate and Rhythm: Normal rate and regular rhythm.      Pulses: Normal pulses.      Heart sounds: Normal heart sounds. No murmur heard.     No friction rub. No gallop.   Pulmonary:      Effort: Pulmonary effort is normal. No respiratory distress.      Breath sounds: Normal breath sounds. No wheezing.   Abdominal:      General: Bowel sounds are normal. There is no distension.      Palpations: Abdomen is soft.      Tenderness: There is no abdominal tenderness.   Musculoskeletal:         General: No deformity. Normal range of motion.   Skin:     General: Skin is warm and dry.      Capillary Refill: Capillary refill takes less than 2 seconds.   Neurological:      General: No focal deficit present.      Mental Status: He is alert and oriented to person, place, and time.   Psychiatric:         Mood and Affect: Mood normal.         Assessment/Plan   Problem List Items Addressed This Visit             ICD-10-CM    Seizures (Multi) R56.9    Relevant Medications    carBAMazepine (TEGretol) 200 mg tablet    Other Relevant Orders    Comprehensive Metabolic Panel    Carbamazepine    Pneumonia of left lower lobe due to infectious organism - Primary J18.9    Therapeutic drug monitoring Z51.81    Relevant Orders    Comprehensive Metabolic Panel    Carbamazepine

## 2024-12-07 LAB — CARBAMAZEPINE SERPL-MCNC: 5.5 UG/ML (ref 4–12)
